# Patient Record
Sex: MALE | Race: BLACK OR AFRICAN AMERICAN | NOT HISPANIC OR LATINO | Employment: UNEMPLOYED | URBAN - METROPOLITAN AREA
[De-identification: names, ages, dates, MRNs, and addresses within clinical notes are randomized per-mention and may not be internally consistent; named-entity substitution may affect disease eponyms.]

---

## 2017-05-30 ENCOUNTER — HOSPITAL ENCOUNTER (EMERGENCY)
Facility: OTHER | Age: 27
Discharge: HOME | End: 2017-05-31
Attending: EMERGENCY MEDICINE
Payer: MEDICARE

## 2017-05-30 DIAGNOSIS — Z91.199 MEDICALLY NONCOMPLIANT: ICD-10-CM

## 2017-05-30 DIAGNOSIS — Z86.59 H/O BIPOLAR DISORDER: ICD-10-CM

## 2017-05-30 DIAGNOSIS — R45.851 PASSIVE SUICIDAL IDEATIONS: ICD-10-CM

## 2017-05-30 DIAGNOSIS — F31.9 BIPOLAR AFFECTIVE DISORDER, REMISSION STATUS UNSPECIFIED: Primary | ICD-10-CM

## 2017-05-30 LAB
ALBUMIN SERPL BCP-MCNC: 3.8 G/DL
ALP SERPL-CCNC: 108 U/L
ALT SERPL W/O P-5'-P-CCNC: 60 U/L
AMPHET+METHAMPHET UR QL: NEGATIVE
ANION GAP SERPL CALC-SCNC: 10 MMOL/L
APAP SERPL-MCNC: <3 UG/ML
AST SERPL-CCNC: 70 U/L
BARBITURATES UR QL SCN>200 NG/ML: NEGATIVE
BASOPHILS # BLD AUTO: 0.03 K/UL
BASOPHILS NFR BLD: 0.4 %
BENZODIAZ UR QL SCN>200 NG/ML: NEGATIVE
BILIRUB SERPL-MCNC: 0.3 MG/DL
BILIRUB UR QL STRIP: NEGATIVE
BUN SERPL-MCNC: 14 MG/DL
BZE UR QL SCN: NEGATIVE
CALCIUM SERPL-MCNC: 9.2 MG/DL
CANNABINOIDS UR QL SCN: NEGATIVE
CHLORIDE SERPL-SCNC: 105 MMOL/L
CLARITY UR: CLEAR
CO2 SERPL-SCNC: 25 MMOL/L
COLOR UR: YELLOW
CREAT SERPL-MCNC: 1 MG/DL
CREAT UR-MCNC: 194.3 MG/DL
DIFFERENTIAL METHOD: ABNORMAL
EOSINOPHIL # BLD AUTO: 0.3 K/UL
EOSINOPHIL NFR BLD: 3.4 %
ERYTHROCYTE [DISTWIDTH] IN BLOOD BY AUTOMATED COUNT: 12.7 %
EST. GFR  (AFRICAN AMERICAN): >60 ML/MIN/1.73 M^2
EST. GFR  (NON AFRICAN AMERICAN): >60 ML/MIN/1.73 M^2
ETHANOL SERPL-MCNC: <10 MG/DL
GLUCOSE SERPL-MCNC: 135 MG/DL
GLUCOSE UR QL STRIP: NEGATIVE
HCT VFR BLD AUTO: 41.1 %
HGB BLD-MCNC: 15 G/DL
HGB UR QL STRIP: NEGATIVE
KETONES UR QL STRIP: NEGATIVE
LEUKOCYTE ESTERASE UR QL STRIP: NEGATIVE
LYMPHOCYTES # BLD AUTO: 2.5 K/UL
LYMPHOCYTES NFR BLD: 32.9 %
MCH RBC QN AUTO: 29.3 PG
MCHC RBC AUTO-ENTMCNC: 36.5 %
MCV RBC AUTO: 80 FL
METHADONE UR QL SCN>300 NG/ML: NEGATIVE
MONOCYTES # BLD AUTO: 0.6 K/UL
MONOCYTES NFR BLD: 8.3 %
NEUTROPHILS # BLD AUTO: 4.2 K/UL
NEUTROPHILS NFR BLD: 54.7 %
NITRITE UR QL STRIP: NEGATIVE
OPIATES UR QL SCN: NEGATIVE
PCP UR QL SCN>25 NG/ML: NEGATIVE
PH UR STRIP: 7 [PH] (ref 5–8)
PLATELET # BLD AUTO: 223 K/UL
PMV BLD AUTO: 10.5 FL
POTASSIUM SERPL-SCNC: 4.1 MMOL/L
PROT SERPL-MCNC: 7.1 G/DL
PROT UR QL STRIP: NEGATIVE
RBC # BLD AUTO: 5.12 M/UL
SODIUM SERPL-SCNC: 140 MMOL/L
SP GR UR STRIP: 1.01 (ref 1–1.03)
TOXICOLOGY INFORMATION: NORMAL
TSH SERPL DL<=0.005 MIU/L-ACNC: 2.7 UIU/ML
URN SPEC COLLECT METH UR: NORMAL
UROBILINOGEN UR STRIP-ACNC: NEGATIVE EU/DL
WBC # BLD AUTO: 7.61 K/UL

## 2017-05-30 PROCEDURE — 84443 ASSAY THYROID STIM HORMONE: CPT

## 2017-05-30 PROCEDURE — 80329 ANALGESICS NON-OPIOID 1 OR 2: CPT

## 2017-05-30 PROCEDURE — 99284 EMERGENCY DEPT VISIT MOD MDM: CPT | Mod: 25

## 2017-05-30 PROCEDURE — 81003 URINALYSIS AUTO W/O SCOPE: CPT

## 2017-05-30 PROCEDURE — 85025 COMPLETE CBC W/AUTO DIFF WBC: CPT

## 2017-05-30 PROCEDURE — 80307 DRUG TEST PRSMV CHEM ANLYZR: CPT

## 2017-05-30 PROCEDURE — 80053 COMPREHEN METABOLIC PANEL: CPT

## 2017-05-30 PROCEDURE — 93005 ELECTROCARDIOGRAM TRACING: CPT

## 2017-05-30 PROCEDURE — 93010 ELECTROCARDIOGRAM REPORT: CPT | Mod: ,,, | Performed by: INTERNAL MEDICINE

## 2017-05-30 PROCEDURE — 80320 DRUG SCREEN QUANTALCOHOLS: CPT

## 2017-05-31 VITALS
BODY MASS INDEX: 30.31 KG/M2 | HEIGHT: 68 IN | WEIGHT: 200 LBS | HEART RATE: 92 BPM | SYSTOLIC BLOOD PRESSURE: 124 MMHG | TEMPERATURE: 99 F | OXYGEN SATURATION: 94 % | RESPIRATION RATE: 18 BRPM | DIASTOLIC BLOOD PRESSURE: 58 MMHG

## 2017-05-31 RX ORDER — OLANZAPINE 5 MG/1
10 TABLET ORAL
Status: DISCONTINUED | OUTPATIENT
Start: 2017-05-31 | End: 2017-05-31 | Stop reason: HOSPADM

## 2017-05-31 NOTE — ED NOTES
Pt sitting up in bed with eyes open, eating meal tray, respirations even and unlabored, NAD noted, Sitter (Hira) posted for direct observation, will continue to monitor.

## 2017-05-31 NOTE — ED NOTES
Patient seen and evaluated by Psychiatry, Dr. Degroot, who has cleared the patient for discharge. Patient denies, SI, HI, auditory or visual hallucinations.     Irineo Cerda MD  05/31/17 4561

## 2017-05-31 NOTE — ED NOTES
Pt c/o SI/HI. Pt states he has felt like this his whole life. Pt states he wants to torture, then kill people, no one specific. Pt states he does not have a plan to kill himself though. Pt states he feels like he cannot control himself or his emotions. Pt states he also thinks aliens are stalking him and he is afraid of them. No self- inflicted trauma noted. Pt denies self- harm today and denies hurting anyone today. +ETOH per pt. Pt denies drug use. Pt is A & O x 3, denies SOB, fever, chills and N/V/D. No obvious respiratory distress noted. Respirations are even and unlabored. NO nasal flaring and no use of accessory muscles. Skin is warm and dry w/ pink mucosa. Skin is not cyanotic,clammy or diaphoretic. No redness or flushing to the face. VS. LUCY x 3mm. No JVD. BBS- CTA w/out rales, rhonchi or wheezing. All fields equal upon auscultation. =chest rise observed. Abd- SNT. BS x 4 quadrants. Pt denies dysuria and constipation. PSM x 4 exts. ARNOLD w/difficulty. +2 pulses x 4 exts. No swelling, redness, difference in temperature or deformity to exts x 4. Bed is locked and in the low position w/ the side rails up and locked for safety. Call bell @ BS. Will continue to monitor closely.

## 2017-05-31 NOTE — ED NOTES
DR Degroot states Pt to be discharged. Pt loud and aggressive towards Dr Degroot, and sitter, and refusing to take Rx.. Stating that he wants us to get him a new ID so that he can cash his checks.Security called for pt and staff safety. Pt belongings returned, and pt escorted out of ED by security without incident.

## 2017-05-31 NOTE — ED PROVIDER NOTES
"Encounter Date: 5/30/2017    SCRIBE #1 NOTE: I, Geovanna Palma , am scribing for, and in the presence of, Dr. Chang.       History     Chief Complaint   Patient presents with    Suicidal     pt reports he is suicidial and wants to be treated in a long term facility     Review of patient's allergies indicates:  No Known Allergies  Time seen by provider: 9:34 PM    This is a 26 y.o. male, with history of bipolar disorder, who presents with complaint of suicidal ideation. Symptoms began today. He does not have a specific plan. Pt reports HI, but denies fever, chills, nausea, vomiting, abdominal pain, chest pain, SOB, weakness, hallucinations, or self-injury. Symptoms are described as constant. He feels as if he "can not be in society." Pt denies any alleviating factors, and admits non-compliance with psychiatric medication. He was recently discharged from a psychiatric facility. Pt denies use of alcohol or illicit drugs.               The history is provided by the patient.     Past Medical History:   Diagnosis Date    Rhabdomyolysis      No past surgical history on file.  No family history on file.  Social History   Substance Use Topics    Smoking status: Current Every Day Smoker    Smokeless tobacco: Not on file    Alcohol use Yes      Comment: social     Review of Systems   Constitutional: Negative for chills and fever.   HENT: Negative for sore throat.    Respiratory: Negative for shortness of breath.    Cardiovascular: Negative for chest pain.   Gastrointestinal: Negative for abdominal pain, nausea and vomiting.   Genitourinary: Negative for dysuria.   Musculoskeletal: Negative for back pain.   Skin: Negative for rash.   Neurological: Negative for weakness.   Hematological: Does not bruise/bleed easily.   Psychiatric/Behavioral: Positive for suicidal ideas. Negative for hallucinations and self-injury.        Positive for HI.        Physical Exam     Initial Vitals [05/30/17 2107]   BP Pulse Resp Temp SpO2   (!) " 138/98 101 18 -- 96 %     Physical Exam    Nursing note and vitals reviewed.  Constitutional: He appears well-developed and well-nourished. He is not diaphoretic. No distress.   HENT:   Head: Normocephalic and atraumatic.   Right Ear: External ear normal.   Left Ear: External ear normal.   Eyes: EOM are normal. Pupils are equal, round, and reactive to light. Right eye exhibits no discharge. Left eye exhibits no discharge.   Neck: Normal range of motion.   Cardiovascular: Normal rate, regular rhythm and normal heart sounds. Exam reveals no gallop and no friction rub.    No murmur heard.  Pulmonary/Chest: Breath sounds normal. No respiratory distress. He has no wheezes. He has no rhonchi. He has no rales.   Abdominal: Soft. There is no tenderness. There is no rebound and no guarding.   Musculoskeletal: Normal range of motion. He exhibits no edema or tenderness.   Lymphadenopathy:     He has no cervical adenopathy.   Neurological: He is alert and oriented to person, place, and time.   Skin: Skin is warm and dry. No rash and no abscess noted. No erythema. No pallor.   Psychiatric:   SI with no plan. HI with no plan. No hallucinations. Calm and cooperative.          ED Course   Procedures  Labs Reviewed - No data to display  EKG Readings: (Independently Interpreted)   Normal sinus rhythm at a rate of 91 bpm. No ST-T wave changes.           Medical Decision Making:   Clinical Tests:   Lab Tests: Ordered and Reviewed  Medical Tests: Ordered and Reviewed    Additional MDM:   Comments: 25 y/o male with h/o bipolar d/o here with passive SI and HI.  Noncompliant with meds.  PEC completed Pt is now medically cleared.  .          Scribe Attestation:   Scribe #1: I performed the above scribed service and the documentation accurately describes the services I performed. I attest to the accuracy of the note.    Attending Attestation:           Physician Attestation for Scribe:  Physician Attestation Statement for Scribe #1: Dr. KATEY  Charlene, reviewed documentation, as scribed by Geovanna Palma  in my presence, and it is both accurate and complete.                 ED Course     Clinical Impression:   No diagnosis found.            Pili Chang MD  05/30/17 4835

## 2017-05-31 NOTE — ED TRIAGE NOTES
Pt is getting agitated in triage and started to hit his head against the wall, security came to triage and is sitting with patient to maintain pt safety.
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
## 2017-08-14 ENCOUNTER — HOSPITAL ENCOUNTER (EMERGENCY)
Facility: OTHER | Age: 27
Discharge: HOME | End: 2017-08-14
Attending: EMERGENCY MEDICINE
Payer: MEDICARE

## 2017-08-14 VITALS
HEART RATE: 99 BPM | HEIGHT: 68 IN | OXYGEN SATURATION: 96 % | DIASTOLIC BLOOD PRESSURE: 78 MMHG | WEIGHT: 180 LBS | SYSTOLIC BLOOD PRESSURE: 137 MMHG | BODY MASS INDEX: 27.28 KG/M2 | TEMPERATURE: 98 F | RESPIRATION RATE: 14 BRPM

## 2017-08-14 DIAGNOSIS — M25.551 PAIN OF RIGHT HIP JOINT: Primary | ICD-10-CM

## 2017-08-14 PROCEDURE — 99283 EMERGENCY DEPT VISIT LOW MDM: CPT

## 2017-08-14 PROCEDURE — 25000003 PHARM REV CODE 250: Performed by: PHYSICIAN ASSISTANT

## 2017-08-14 RX ORDER — ACETAMINOPHEN 500 MG
1000 TABLET ORAL
Status: COMPLETED | OUTPATIENT
Start: 2017-08-14 | End: 2017-08-14

## 2017-08-14 RX ADMIN — ACETAMINOPHEN 1000 MG: 500 TABLET ORAL at 03:08

## 2017-08-14 NOTE — ED NOTES
Right hip pain x couple of days. Sharp pain. Ambulatory with steady gait. NAD. Will cont to monitor.

## 2017-08-14 NOTE — ED NOTES
Patient requesting a sandwich in triage and has on paper scrubs.  Patient is also laughing randomly and is easily distracted.  Patient illiciting bizarre behavior.

## 2017-08-15 NOTE — ED PROVIDER NOTES
Encounter Date: 8/14/2017       History     Chief Complaint   Patient presents with    Hip Pain     Patient c/o intermittent pain to right hip radiating to right leg.  Patient denies trauma.  Patient stated it has been going on for days.      Patient is a 26-year-old male with history of psychiatric disorder who presents to the emergency department with right hip pain.  Patient states he is staying at the shelter.  Patient states he is here to get a bag lunch and to check out his right hip pain.  Patient states over the last couple of days he has had a sharp pain in his right hip.  He denies any injury.  He denies any swelling, redness, or warmth of the hip.  He states he is able to walk without difficulty.  He states the pain is intermittent.  He states he only has 19 minutes before he has to get to the shelter, so he would really like a sandwhich, chips, and a soda.  He denies fevers, chills, nausea, vomiting, diarrhea, or any other associated symptoms.  He denies saddle anesthesia or bowel or bladder incontinence or retention.      The history is provided by the patient.     Review of patient's allergies indicates:  No Known Allergies  Past Medical History:   Diagnosis Date    Rhabdomyolysis      History reviewed. No pertinent surgical history.  History reviewed. No pertinent family history.  Social History   Substance Use Topics    Smoking status: Current Every Day Smoker     Types: Cigarettes    Smokeless tobacco: Never Used    Alcohol use Yes      Comment: social     Review of Systems   Constitutional: Negative for activity change, appetite change, chills, fatigue and fever.   HENT: Negative for congestion, ear discharge, ear pain, postnasal drip, sore throat and trouble swallowing.    Respiratory: Negative for cough and shortness of breath.    Cardiovascular: Negative for chest pain.   Gastrointestinal: Negative for abdominal pain, blood in stool, constipation, diarrhea, nausea and vomiting.    Genitourinary: Negative for dysuria, flank pain and hematuria.   Musculoskeletal: Negative for back pain, neck pain and neck stiffness.        Hip pain   Skin: Negative for rash and wound.   Neurological: Negative for dizziness, syncope, speech difficulty, weakness, light-headedness, numbness and headaches.       Physical Exam     Initial Vitals [08/14/17 1503]   BP Pulse Resp Temp SpO2   137/78 99 14 98.4 °F (36.9 °C) 96 %      MAP       97.67         Physical Exam    Nursing note and vitals reviewed.  Constitutional: He appears well-developed and well-nourished. He is not diaphoretic.  Non-toxic appearance. No distress.   Unkempt, malodorous, in paper scrubs   HENT:   Head: Normocephalic.   Right Ear: Hearing and external ear normal.   Left Ear: Hearing and external ear normal.   Nose: Nose normal.   Mouth/Throat: Uvula is midline, oropharynx is clear and moist and mucous membranes are normal. No trismus in the jaw. No uvula swelling. No oropharyngeal exudate.   Eyes: Conjunctivae are normal. Pupils are equal, round, and reactive to light.   Neck: Normal range of motion.   Cardiovascular: Normal rate and regular rhythm.   Pulmonary/Chest: Breath sounds normal. No respiratory distress. He has no wheezes. He has no rhonchi. He has no rales. He exhibits no tenderness.   Abdominal: Soft. Bowel sounds are normal. He exhibits no distension and no mass. There is no tenderness. There is no rebound and no guarding.   Musculoskeletal:        Right hip: Normal. He exhibits normal range of motion, no bony tenderness, no swelling, no crepitus and no deformity.   Lymphadenopathy:     He has no cervical adenopathy.   Neurological: He is alert and oriented to person, place, and time.   Skin: Skin is warm and dry. Capillary refill takes less than 2 seconds.   Psychiatric:   Weird affect           ED Course   Procedures  Labs Reviewed - No data to display          Medical Decision Making:   Initial Assessment:   Urgent evaluation  of a 26-year-old male with history of psychiatric disorder who presents to the emergency department with right hip pain.  Patient is afebrile, nontoxic appearing, and hemodynamically stable.  No bony tenderness of hip.  Patient is ambulating without difficulty.  He continues to request food.  States he wants to be discharged in 19 minutes because he needs to be to the shelter.  He states he does not have time for full exam or x-rays.  I do not feel that further workup is warranted.  Patient is advised to follow-up with PCP or return to the emergency department with any worsening symptoms or concerns.  Other:   I have discussed this case with another health care provider.       <> Summary of the Discussion: Dr. Cerda                   ED Course     Clinical Impression:   The encounter diagnosis was Pain of right hip joint.                           Kelly Ribera PA-C  08/15/17 0049

## 2017-08-19 ENCOUNTER — HOSPITAL ENCOUNTER (EMERGENCY)
Facility: OTHER | Age: 27
Discharge: HOME | End: 2017-08-19
Attending: EMERGENCY MEDICINE
Payer: MEDICARE

## 2017-08-19 VITALS
TEMPERATURE: 98 F | BODY MASS INDEX: 27.8 KG/M2 | HEART RATE: 66 BPM | WEIGHT: 183.44 LBS | RESPIRATION RATE: 18 BRPM | HEIGHT: 68 IN | DIASTOLIC BLOOD PRESSURE: 65 MMHG | SYSTOLIC BLOOD PRESSURE: 108 MMHG | OXYGEN SATURATION: 100 %

## 2017-08-19 DIAGNOSIS — M54.31 SCIATICA, RIGHT SIDE: ICD-10-CM

## 2017-08-19 DIAGNOSIS — J06.9 UPPER RESPIRATORY TRACT INFECTION, UNSPECIFIED TYPE: Primary | ICD-10-CM

## 2017-08-19 DIAGNOSIS — R05.9 COUGH: ICD-10-CM

## 2017-08-19 DIAGNOSIS — S39.012A LUMBOSACRAL STRAIN, INITIAL ENCOUNTER: ICD-10-CM

## 2017-08-19 PROCEDURE — 99283 EMERGENCY DEPT VISIT LOW MDM: CPT | Mod: 25

## 2017-08-19 PROCEDURE — 63600175 PHARM REV CODE 636 W HCPCS: Performed by: EMERGENCY MEDICINE

## 2017-08-19 PROCEDURE — 96372 THER/PROPH/DIAG INJ SC/IM: CPT

## 2017-08-19 RX ORDER — ORPHENADRINE CITRATE 100 MG/1
100 TABLET, EXTENDED RELEASE ORAL DAILY PRN
Qty: 5 TABLET | Refills: 0 | Status: SHIPPED | OUTPATIENT
Start: 2017-08-19 | End: 2017-08-24

## 2017-08-19 RX ORDER — TRAMADOL HYDROCHLORIDE 50 MG/1
50 TABLET ORAL EVERY 6 HOURS PRN
Qty: 8 TABLET | Refills: 0 | Status: SHIPPED | OUTPATIENT
Start: 2017-08-19 | End: 2017-08-29

## 2017-08-19 RX ORDER — IBUPROFEN 800 MG/1
800 TABLET ORAL EVERY 6 HOURS PRN
Qty: 12 TABLET | Refills: 0 | Status: SHIPPED | OUTPATIENT
Start: 2017-08-19

## 2017-08-19 RX ORDER — KETOROLAC TROMETHAMINE 30 MG/ML
30 INJECTION, SOLUTION INTRAMUSCULAR; INTRAVENOUS
Status: COMPLETED | OUTPATIENT
Start: 2017-08-19 | End: 2017-08-19

## 2017-08-19 RX ORDER — DEXAMETHASONE SODIUM PHOSPHATE 4 MG/ML
8 INJECTION, SOLUTION INTRA-ARTICULAR; INTRALESIONAL; INTRAMUSCULAR; INTRAVENOUS; SOFT TISSUE
Status: COMPLETED | OUTPATIENT
Start: 2017-08-19 | End: 2017-08-19

## 2017-08-19 RX ADMIN — KETOROLAC TROMETHAMINE 30 MG: 30 INJECTION, SOLUTION INTRAMUSCULAR at 07:08

## 2017-08-19 RX ADMIN — DEXAMETHASONE SODIUM PHOSPHATE 8 MG: 4 INJECTION, SOLUTION INTRAMUSCULAR; INTRAVENOUS at 07:08

## 2017-08-19 NOTE — ED NOTES
Pt states that he has had a productive cough X 2 days that makes his rt leg hurt. His rt leg has been hurting for a long time.

## 2017-08-19 NOTE — ED PROVIDER NOTES
"Encounter Date: 8/19/2017    SCRIBE #1 NOTE: I, Malissa Castelan, am scribing for, and in the presence of,  Dr. Beard. I have scribed the entire note.       History     Chief Complaint   Patient presents with    Cough     non-productive cough x 2 days and R side lateral thigh nerve pain x "weeks"     Time seen by provider: 7:14 AM    This is a 26 y.o. male who presents with complaint of R back pain x one month that radiates down his leg. He describes the pain as intermittent, "sharp," and rates it a 10/10 in severity. The pain is exacerbated with movements and walking. He has no relief with ibuprofen. He has visited the ED in the past with similar complaints. He also notes back pain x 2 weeks. Pt denies any trauma. He reports no associated numbness, weakness, tingling, bowel incontinence, urinary incontinence, or urinary retention. Pt also c/o productive cough with green phlegm x 2 days. He has accompanying subjective fever and chills. He denies any sweating, HA, rhinorrhea, sore throat, CP, and SOB. He admits to smoking cigarettes, marijuana, and "k2." Pt also drinks. He denies any major medical issues.      The history is provided by the patient.     Review of patient's allergies indicates:  No Known Allergies  Past Medical History:   Diagnosis Date    Rhabdomyolysis      History reviewed. No pertinent surgical history.  History reviewed. No pertinent family history.  Social History   Substance Use Topics    Smoking status: Current Every Day Smoker     Types: Cigarettes    Smokeless tobacco: Never Used    Alcohol use Yes      Comment: social     Review of Systems   Constitutional: Positive for chills and fever (subjective). Negative for diaphoresis.   HENT: Negative for congestion, rhinorrhea and sore throat.    Respiratory: Positive for cough. Negative for shortness of breath.    Cardiovascular: Negative for chest pain.   Gastrointestinal: Negative for abdominal pain, diarrhea, nausea and vomiting.        " No bowel incontinence.   Endocrine: Negative for polyuria.   Genitourinary: Negative for decreased urine volume and dysuria.        No urinary retention/incontinence.    Musculoskeletal: Positive for back pain.        R hip pain.   Skin: Negative for rash.   Allergic/Immunologic: Negative for immunocompromised state.   Neurological: Negative for dizziness, weakness, numbness and headaches.        No tingling.   Hematological: Does not bruise/bleed easily.   Psychiatric/Behavioral: Negative for confusion.       Physical Exam     Initial Vitals [08/19/17 0613]   BP Pulse Resp Temp SpO2   (!) 144/73 84 16 97.6 °F (36.4 °C) 97 %      MAP       96.67         Physical Exam    Nursing note and vitals reviewed.  Constitutional: He appears well-developed and well-nourished. He is not diaphoretic. No distress.   HENT:   Head: Normocephalic and atraumatic.   Right Ear: External ear normal.   Left Ear: External ear normal.   Oropharynx is clear and intact.  Moist mucous membranes.   Eyes: Conjunctivae and EOM are normal. Pupils are equal, round, and reactive to light.   Conjunctiva are pink, clear, and intact.   Neck: Normal range of motion. Neck supple.   Cardiovascular: Normal rate, regular rhythm and normal heart sounds.   Normal S1, S2. No murmurs, no rubs, no gallops.   Pulmonary/Chest: Breath sounds normal. No respiratory distress. He has no wheezes. He has no rhonchi. He has no rales.   Clear to ascultation bilaterally.   Abdominal: Soft. Bowel sounds are normal. He exhibits no distension. There is no tenderness. There is no rebound and no guarding.   No audible bruits.   Musculoskeletal: Normal range of motion. He exhibits no edema.   No midline C-T-L-spine tenderness to palpation, crepitus or step-offs. R sided paraspinal tenderness.   Lymphadenopathy:     He has no cervical adenopathy.   Neurological: He is alert and oriented to person, place, and time. He has normal strength. No sensory deficit.   Strength 5/5 to  bilateral lower extremities. Sensation to light touch intact. No saddle paraesthesia.    Skin: Skin is warm and dry. No rash noted. No erythema.   Psychiatric: He has a normal mood and affect. His behavior is normal. Thought content normal.         ED Course   Procedures  Labs Reviewed - No data to display       X-Rays:   Independently Interpreted Readings:   Chest X-Ray: No acute abnormalities.  No infiltrates.      Imaging Results          X-Ray Chest PA And Lateral (Final result)  Result time 08/19/17 07:57:02    Final result by Sly Hairston MD (08/19/17 07:57:02)                 Impression:        No evidence of acute intrathoracic disease.      Electronically signed by: SLY HAIRSTON MD  Date:     08/19/17  Time:    07:57              Narrative:    Technique:  Chest PA and lateral radiographs    Comparison: None.    Findings:     Lung volumes are normal and symmetric. No pleural fluid or pneumothorax. The mediastinal structures are midline. The cardiac silhouette is normal in size. The osseous structures demonstrate no acute abnormality.                              Medical Decision Making:   Independently Interpreted Test(s):   I have ordered and independently interpreted X-rays - see prior notes.  Clinical Tests:   Radiological Study: Ordered and Reviewed            Scribe Attestation:   Scribe #1: I performed the above scribed service and the documentation accurately describes the services I performed. I attest to the accuracy of the note.    Attending Attestation:           Physician Attestation for Scribe:  Physician Attestation Statement for Scribe #1: I, Dr. Beard, reviewed documentation, as scribed by Malissa Castelan in my presence, and it is both accurate and complete.         Attending ED Notes:   Emergent evaluation a 26-year-old male complaining of nontraumatic back pain radiating down his right leg.  Patient also complains of 2-3 day history of cough.  Patient is afebrile,  nontoxic-appearing with stable vital signs.  Patient is neurovascularly intact without focal neurologic deficits.  No midline C-spine, T-spine or L-spine tenderness palpation, crepitus or step-offs.  No clinical evidence of cauda equina syndrome.Strength 5/5 throughout.  Sensation intact to light touch throughout.  Two point discrimination intact throughout.  No saddle paresthesias.  Reflexes 2+ throughout.  No acute findings on chest x-ray.  The patient is extensive the counseled on his diagnosis and treatment including all diagnostic and physical exam findings.  The patient is discharged in good condition and directed follow-up his PCP in the next 24-48 hours.          ED Course     Clinical Impression:     1. Upper respiratory tract infection, unspecified type    2. Cough    3. Lumbosacral strain, initial encounter    4. Sciatica, right side                                 Irineo Cerda MD  08/19/17 9415

## 2017-08-24 ENCOUNTER — HOSPITAL ENCOUNTER (EMERGENCY)
Facility: OTHER | Age: 27
Discharge: PSYCHIATRIC OR SUBSTANCE USE FACILITY | End: 2017-08-25
Attending: EMERGENCY MEDICINE
Payer: MEDICARE

## 2017-08-24 DIAGNOSIS — R45.851 SUICIDAL IDEATIONS: Primary | ICD-10-CM

## 2017-08-24 LAB
AMPHET+METHAMPHET UR QL: NEGATIVE
ANION GAP SERPL CALC-SCNC: 7 MMOL/L
APAP SERPL-MCNC: <3 UG/ML
BARBITURATES UR QL SCN>200 NG/ML: NEGATIVE
BASOPHILS # BLD AUTO: 0.01 K/UL
BASOPHILS NFR BLD: 0.2 %
BENZODIAZ UR QL SCN>200 NG/ML: NEGATIVE
BILIRUB UR QL STRIP: NEGATIVE
BUN SERPL-MCNC: 12 MG/DL
BZE UR QL SCN: NEGATIVE
CALCIUM SERPL-MCNC: 9.1 MG/DL
CANNABINOIDS UR QL SCN: NEGATIVE
CHLORIDE SERPL-SCNC: 109 MMOL/L
CLARITY UR: CLEAR
CO2 SERPL-SCNC: 24 MMOL/L
COLOR UR: YELLOW
CREAT SERPL-MCNC: 1 MG/DL
CREAT UR-MCNC: 261.9 MG/DL
DIFFERENTIAL METHOD: ABNORMAL
EOSINOPHIL # BLD AUTO: 0.1 K/UL
EOSINOPHIL NFR BLD: 2.6 %
ERYTHROCYTE [DISTWIDTH] IN BLOOD BY AUTOMATED COUNT: 12.4 %
EST. GFR  (AFRICAN AMERICAN): >60 ML/MIN/1.73 M^2
EST. GFR  (NON AFRICAN AMERICAN): >60 ML/MIN/1.73 M^2
ETHANOL SERPL-MCNC: <10 MG/DL
GLUCOSE SERPL-MCNC: 90 MG/DL
GLUCOSE UR QL STRIP: NEGATIVE
HCT VFR BLD AUTO: 37.9 %
HGB BLD-MCNC: 13.7 G/DL
HGB UR QL STRIP: ABNORMAL
KETONES UR QL STRIP: ABNORMAL
LEUKOCYTE ESTERASE UR QL STRIP: NEGATIVE
LYMPHOCYTES # BLD AUTO: 1.9 K/UL
LYMPHOCYTES NFR BLD: 35.5 %
MCH RBC QN AUTO: 29.2 PG
MCHC RBC AUTO-ENTMCNC: 36.1 G/DL
MCV RBC AUTO: 81 FL
METHADONE UR QL SCN>300 NG/ML: NEGATIVE
MONOCYTES # BLD AUTO: 0.4 K/UL
MONOCYTES NFR BLD: 7.1 %
NEUTROPHILS # BLD AUTO: 3 K/UL
NEUTROPHILS NFR BLD: 54.4 %
NITRITE UR QL STRIP: NEGATIVE
OPIATES UR QL SCN: NEGATIVE
PCP UR QL SCN>25 NG/ML: NEGATIVE
PH UR STRIP: 6 [PH] (ref 5–8)
PLATELET # BLD AUTO: 253 K/UL
PMV BLD AUTO: 9.4 FL
POTASSIUM SERPL-SCNC: 3.7 MMOL/L
PROT UR QL STRIP: NEGATIVE
RBC # BLD AUTO: 4.69 M/UL
SODIUM SERPL-SCNC: 140 MMOL/L
SP GR UR STRIP: 1.02 (ref 1–1.03)
TOXICOLOGY INFORMATION: NORMAL
URN SPEC COLLECT METH UR: ABNORMAL
UROBILINOGEN UR STRIP-ACNC: NEGATIVE EU/DL
WBC # BLD AUTO: 5.46 K/UL

## 2017-08-24 PROCEDURE — 80048 BASIC METABOLIC PNL TOTAL CA: CPT

## 2017-08-24 PROCEDURE — 81003 URINALYSIS AUTO W/O SCOPE: CPT | Mod: 59

## 2017-08-24 PROCEDURE — 85025 COMPLETE CBC W/AUTO DIFF WBC: CPT

## 2017-08-24 PROCEDURE — 63600175 PHARM REV CODE 636 W HCPCS: Performed by: EMERGENCY MEDICINE

## 2017-08-24 PROCEDURE — 99285 EMERGENCY DEPT VISIT HI MDM: CPT | Mod: 25

## 2017-08-24 PROCEDURE — P9612 CATHETERIZE FOR URINE SPEC: HCPCS

## 2017-08-24 PROCEDURE — 80329 ANALGESICS NON-OPIOID 1 OR 2: CPT

## 2017-08-24 PROCEDURE — 80320 DRUG SCREEN QUANTALCOHOLS: CPT

## 2017-08-24 PROCEDURE — 96372 THER/PROPH/DIAG INJ SC/IM: CPT

## 2017-08-24 PROCEDURE — 80307 DRUG TEST PRSMV CHEM ANLYZR: CPT

## 2017-08-24 RX ORDER — DIPHENHYDRAMINE HYDROCHLORIDE 50 MG/ML
25 INJECTION INTRAMUSCULAR; INTRAVENOUS
Status: COMPLETED | OUTPATIENT
Start: 2017-08-24 | End: 2017-08-24

## 2017-08-24 RX ORDER — DIPHENHYDRAMINE HYDROCHLORIDE 50 MG/ML
12.5 INJECTION INTRAMUSCULAR; INTRAVENOUS
Status: DISCONTINUED | OUTPATIENT
Start: 2017-08-24 | End: 2017-08-24

## 2017-08-24 RX ORDER — HALOPERIDOL 5 MG/ML
5 INJECTION INTRAMUSCULAR
Status: COMPLETED | OUTPATIENT
Start: 2017-08-24 | End: 2017-08-24

## 2017-08-24 RX ADMIN — DIPHENHYDRAMINE HYDROCHLORIDE 25 MG: 50 INJECTION, SOLUTION INTRAMUSCULAR; INTRAVENOUS at 06:08

## 2017-08-24 RX ADMIN — LORAZEPAM 2 MG: 2 INJECTION INTRAMUSCULAR; INTRAVENOUS at 06:08

## 2017-08-24 RX ADMIN — HALOPERIDOL LACTATE 5 MG: 5 INJECTION, SOLUTION INTRAMUSCULAR at 06:08

## 2017-08-24 NOTE — ED NOTES
Pt belongings:  -2 terrell sneakers  -blue polo  -jeans with belt    Valuables bag:  -wallet  -watch    Belongings and valuables given to security.
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
## 2017-08-24 NOTE — ED NOTES
Pt transferred to  2, security at bedside. Pt changed into paper scrubs. hannah Espino at bedside.

## 2017-08-24 NOTE — ED NOTES
Antwan Espino requesting pt to change into paper scrubs, pt started to become agitated and kicking bed. Security remains at bedside. MD and charge nurse at bedside aware of pt's behavior. Pt will verify name and , will not answer any further questions.

## 2017-08-24 NOTE — ED PROVIDER NOTES
"Encounter Date: 8/24/2017    SCRIBE #1 NOTE: I, Silvana Ramirez, am scribing for, and in the presence of,  Dr. Do. I have scribed the entire note.       History     Chief Complaint   Patient presents with    Suicidal     PT REPORTS SUICIDAL THOUGHTS "I NEED TO GO SOMEWHERE NOW."      Time seen by provider: 6:09 PM    This is a 26 y.o. male who presents for psychiatric evaluation. The patient walked into the ED screaming "I need to get to somewhere now". He states he has suicidal thoughts and needs help immediately. The patient does not answer any more questions and continues to scream. As per medical record the patient has a hx of bipolar disorder. However, no medications are listed or can be obtained at this time.       The history is provided by the patient. The history is limited by the condition of the patient.     Review of patient's allergies indicates:  No Known Allergies  Past Medical History:   Diagnosis Date    Rhabdomyolysis      No past surgical history on file.  No family history on file.  Social History   Substance Use Topics    Smoking status: Current Every Day Smoker     Types: Cigarettes    Smokeless tobacco: Never Used    Alcohol use Yes      Comment: social     Review of Systems   Unable to perform ROS: Psychiatric disorder       Physical Exam     Initial Vitals [08/24/17 1745]   BP Pulse Resp Temp SpO2   133/78 (!) 111 18 -- 97 %      MAP       96.33         Physical Exam    Nursing note and vitals reviewed.  Constitutional: He appears well-developed and well-nourished. He is not diaphoretic. No distress.   Agitated   HENT:   Head: Normocephalic and atraumatic.   Right Ear: External ear normal.   Left Ear: External ear normal.   Eyes: Conjunctivae and EOM are normal.   Neck: Normal range of motion. Neck supple.   Cardiovascular: Regular rhythm and normal heart sounds. Tachycardia present.  Exam reveals no gallop and no friction rub.    No murmur heard.  Pulmonary/Chest: Breath " sounds normal. He has no wheezes. He has no rhonchi. He has no rales.   Abdominal: Soft. Bowel sounds are normal. There is no tenderness. There is no rebound and no guarding.   Musculoskeletal: Normal range of motion. He exhibits no edema or tenderness.   Lymphadenopathy:     He has no cervical adenopathy.   Neurological: He is alert. He has normal strength.   Skin: Skin is warm and dry. No rash noted.   Psychiatric:   Expanded mood. Suicidal. Questionable homicidal.          ED Course   Critical Care  Date/Time: 8/24/2017 6:59 PM  Performed by: MATHEW KATHLEEN.  Authorized by: MATHEW KATHLEEN.   Direct patient critical care time: 10 minutes  Additional history critical care time: 5 minutes  Ordering / reviewing critical care time: 5 minutes  Documentation critical care time: 5 minutes  Consulting other physicians critical care time: 5 minutes  Consult with family critical care time: 5 minutes  Total critical care time (exclusive of procedural time) : 35 minutes  Critical care time was exclusive of separately billable procedures and treating other patients and teaching time.  Critical care was necessary to treat or prevent imminent or life-threatening deterioration of the following conditions: acute psychosis, agitation.  Critical care was time spent personally by me on the following activities: development of treatment plan with patient or surrogate, evaluation of patient's response to treatment, examination of patient, obtaining history from patient or surrogate, ordering and review of laboratory studies and re-evaluation of patient's condition (having the patient sedated).        Labs Reviewed   CBC W/ AUTO DIFFERENTIAL - Abnormal; Notable for the following:        Result Value    Hemoglobin 13.7 (*)     Hematocrit 37.9 (*)     MCV 81 (*)     MCHC 36.1 (*)     All other components within normal limits   URINALYSIS - Abnormal; Notable for the following:     Ketones, UA Trace (*)     Occult Blood UA Trace  "(*)     All other components within normal limits   ACETAMINOPHEN LEVEL - Abnormal; Notable for the following:     Acetaminophen (Tylenol), Serum <3.0 (*)     All other components within normal limits   BASIC METABOLIC PANEL - Abnormal; Notable for the following:     Anion Gap 7 (*)     All other components within normal limits   DRUG SCREEN PANEL, URINE EMERGENCY   ALCOHOL,MEDICAL (ETHANOL)             Medical Decision Making:   Clinical Tests:   Lab Tests: Ordered and Reviewed  ED Management:  0.6-year-old male very aggressive stating he is suicidal and needs a psych hospital.  Patient's extremely agitated.  We'll not stated anything else other than "I need a psych hospital."  Due to his extreme agitation concern for the safety of the patient as well as my medical staff I do feel the patient needs to be medically sedated.  We'll give him Haldol, Benadryl and Ativan and get labs.  I will place the patient on a PEC due to his suicidal as well as was concerning for homicidal aggression.    8:00 PM labs reviewed.  The patient is medically cleared for psychiatric evaluation.            Scribe Attestation:   Scribe #1: I performed the above scribed service and the documentation accurately describes the services I performed. I attest to the accuracy of the note.    Attending Attestation:           Physician Attestation for Scribe:  Physician Attestation Statement for Scribe #1: I, Dr. Do, reviewed documentation, as scribed by Silvana Ramirez in my presence, and it is both accurate and complete.                 ED Course     Clinical Impression:     1. Suicidal ideations                                 Luiz Do, DO  08/24/17 2007    "

## 2017-08-25 VITALS
DIASTOLIC BLOOD PRESSURE: 56 MMHG | OXYGEN SATURATION: 97 % | HEIGHT: 69 IN | RESPIRATION RATE: 16 BRPM | BODY MASS INDEX: 29.03 KG/M2 | WEIGHT: 196 LBS | HEART RATE: 95 BPM | TEMPERATURE: 99 F | SYSTOLIC BLOOD PRESSURE: 115 MMHG

## 2017-08-25 PROCEDURE — 96372 THER/PROPH/DIAG INJ SC/IM: CPT

## 2017-08-25 PROCEDURE — 63600175 PHARM REV CODE 636 W HCPCS: Performed by: EMERGENCY MEDICINE

## 2017-08-25 RX ORDER — DIPHENHYDRAMINE HYDROCHLORIDE 50 MG/ML
25 INJECTION INTRAMUSCULAR; INTRAVENOUS
Status: COMPLETED | OUTPATIENT
Start: 2017-08-25 | End: 2017-08-25

## 2017-08-25 RX ORDER — HALOPERIDOL 5 MG/ML
5 INJECTION INTRAMUSCULAR
Status: COMPLETED | OUTPATIENT
Start: 2017-08-25 | End: 2017-08-25

## 2017-08-25 RX ADMIN — HALOPERIDOL LACTATE 5 MG: 5 INJECTION, SOLUTION INTRAMUSCULAR at 08:08

## 2017-08-25 RX ADMIN — DIPHENHYDRAMINE HYDROCHLORIDE 25 MG: 50 INJECTION, SOLUTION INTRAMUSCULAR; INTRAVENOUS at 08:08

## 2017-08-25 NOTE — ED NOTES
Alfred with [facility] ([phone]) called requesting pt information. Facility requesting current CPK level for chart to be reviewed for admission. Fax number given ([phone])

## 2017-08-25 NOTE — ED NOTES
"Pt requesting food, pt informed meal tray ordered. Pt states "I'm hungry that's why I'm agitated." Pt given orange juice, crackers, and apple sauce until meal tray arrives.  "

## 2017-08-25 NOTE — ED NOTES
[facility] called asking for information about pt. Number was given. Asked to notify [facility] if pt is accepted to facility.

## 2017-08-25 NOTE — ED NOTES
Pt eyes remain closed breathing even and unlabored no signs of any distress noted. Sitter remains in direct obs of patient. Will continue to monitor.

## 2017-08-25 NOTE — ED NOTES
Pt transferred by rob to ed room 5. He did get out of the stretcher and transfer himself onto our psych safe bed. Pt is under direct psych obs by Fabio Miranda Ed risk management sitter. Pt is calm and coopertive with staff. No current complaints. Will continue to monitor.

## 2017-08-25 NOTE — ED NOTES
Pt eyes are closed breathing even and unlabored no signs of any distress noted normal skin and color for ethnicity. Sitter remains in direct obs of pt. Will continue to monitor.

## 2017-08-25 NOTE — ED NOTES
Pt resting comfortably in room 5, awakens to verbal stimuli, respirations even and unlabored, VS stable, in no acute distress. Sitter (Fabio) at bedside.

## 2017-08-25 NOTE — ED NOTES
Pt eyes remain closed breathing even and unlabored no signs of any distress noted. Skin is normal tone for ethnicity. Sitter remains in direct obs of pt. Will continue to monitor.

## 2017-08-25 NOTE — ED NOTES
Bedside report received from CHRIS Gonzalez. Pt sleeping on stretcher, respirations even and unlabored. iliana Mccarthy at bedside. Bed locked in lowest position, side rails up x2, pt in direct view from nurse's station. Will continue to monitor.

## 2017-08-25 NOTE — ED NOTES
Pt asleep breathing even and unlabored no signs of any distress noted will continue to monitor sitter remains in direct obs of patient.

## 2017-08-25 NOTE — ED NOTES
Packet faxed to psychiatric hospitals on [facility] adult placement list. Awaiting call back. Will continue to seek placement.

## 2017-08-25 NOTE — ED NOTES
Pt eyes remain closed. Even chest rise and fall. No signs of any distress is noted from the patient. Sitter remains in direct obs of pt.

## 2017-08-25 NOTE — ED NOTES
Pt agitated, pacing around room with paper scrub top off. Pt threw food on ground, threw urine at door, and punched tv console. MD made aware.  Security called for safety.

## 2017-08-25 NOTE — ED NOTES
Patient states he has belongings at the [facility].  I called [facility] to try to obtain his belongings.  No one answered left voice mail message.

## 2017-08-25 NOTE — ED NOTES
Pt catheterized due to being unable to stand and urinate. Pt is very drowsy and unable to stay awake at the current time. He tolerated the 15f in and out cath very well. 300 ml of clear yellow urine noted. No bleeding noted on removal of catheter.

## 2017-08-25 NOTE — ED NOTES
Pt eyes are closed breathing even and unlabored no signs of any distress noted. Sitter remains in direct obs of patient. Will continue to monitor.

## 2017-08-25 NOTE — ED NOTES
Pt has accepted to [facility] via [facility] [employer] [facility] [facility]. Accepting MD is Dr. Boothe. Shawn 2. Call report to [phone].

## 2017-08-25 NOTE — ED NOTES
Admission packet was faxed to [facility], [facility], [facility], [facility], [facility], [facility], [facility], [facility], [facility]. Awaiting responses.

## 2017-08-25 NOTE — ED NOTES
Aroused easily to verbal stimuli. Pt returned to sleep after arousing breathing even and unlabored no signs of distress noted. Sitter remains in direct obs of pt.

## 2017-08-25 NOTE — ED NOTES
Admission packet was faxed to [facility], [facility], [facility], [facility], [facility], [facility], [facility], [facility], [facility], [facility], [facility]. Awaiting responses.

## 2017-08-25 NOTE — ED NOTES
Spoke with [facility] and they informed me they we contact us when [facility] approves pt and ready for report

## 2017-08-25 NOTE — ED NOTES
Pt laying on stretcher with lights off, respirations even and unlabored, safe environment maintained.

## 2017-08-25 NOTE — ED NOTES
Pt is asleep breathing even and unlabored no signs of any distress noted. Sitter remains in direct obs will continue to monitor.

## 2022-01-28 ENCOUNTER — HISTORICAL (OUTPATIENT)
Dept: ADMINISTRATIVE | Facility: HOSPITAL | Age: 32
End: 2022-01-28